# Patient Record
Sex: FEMALE | Race: WHITE | NOT HISPANIC OR LATINO | URBAN - METROPOLITAN AREA
[De-identification: names, ages, dates, MRNs, and addresses within clinical notes are randomized per-mention and may not be internally consistent; named-entity substitution may affect disease eponyms.]

---

## 2017-02-07 ENCOUNTER — INPATIENT (INPATIENT)
Facility: HOSPITAL | Age: 82
LOS: 5 days | Discharge: NURSING, REHAB OR LONG-TERM CARE | DRG: 872 | End: 2017-02-13
Attending: INTERNAL MEDICINE | Admitting: INTERNAL MEDICINE
Payer: MEDICARE

## 2017-02-07 VITALS
HEART RATE: 89 BPM | OXYGEN SATURATION: 95 % | RESPIRATION RATE: 16 BRPM | SYSTOLIC BLOOD PRESSURE: 145 MMHG | DIASTOLIC BLOOD PRESSURE: 85 MMHG

## 2017-02-07 DIAGNOSIS — R41.82 ALTERED MENTAL STATUS, UNSPECIFIED: ICD-10-CM

## 2017-02-07 DIAGNOSIS — F03.90 UNSPECIFIED DEMENTIA, UNSPECIFIED SEVERITY, WITHOUT BEHAVIORAL DISTURBANCE, PSYCHOTIC DISTURBANCE, MOOD DISTURBANCE, AND ANXIETY: ICD-10-CM

## 2017-02-07 DIAGNOSIS — A41.9 SEPSIS, UNSPECIFIED ORGANISM: ICD-10-CM

## 2017-02-07 DIAGNOSIS — M25.559 PAIN IN UNSPECIFIED HIP: ICD-10-CM

## 2017-02-07 DIAGNOSIS — I10 ESSENTIAL (PRIMARY) HYPERTENSION: ICD-10-CM

## 2017-02-07 LAB
ALBUMIN SERPL ELPH-MCNC: 3.2 G/DL — LOW (ref 3.3–5)
ALP SERPL-CCNC: 65 U/L — SIGNIFICANT CHANGE UP (ref 40–120)
ALT FLD-CCNC: 22 U/L RC — SIGNIFICANT CHANGE UP (ref 10–45)
ANION GAP SERPL CALC-SCNC: 14 MMOL/L — SIGNIFICANT CHANGE UP (ref 5–17)
APPEARANCE UR: ABNORMAL
AST SERPL-CCNC: 36 U/L — SIGNIFICANT CHANGE UP (ref 10–40)
BACTERIA # UR AUTO: ABNORMAL /HPF
BASOPHILS # BLD AUTO: 0 K/UL — SIGNIFICANT CHANGE UP (ref 0–0.2)
BASOPHILS NFR BLD AUTO: 0 % — SIGNIFICANT CHANGE UP (ref 0–2)
BILIRUB SERPL-MCNC: 1.1 MG/DL — SIGNIFICANT CHANGE UP (ref 0.2–1.2)
BILIRUB UR-MCNC: ABNORMAL
BUN SERPL-MCNC: 32 MG/DL — HIGH (ref 7–23)
CALCIUM SERPL-MCNC: 9.6 MG/DL — SIGNIFICANT CHANGE UP (ref 8.4–10.5)
CHLORIDE SERPL-SCNC: 108 MMOL/L — SIGNIFICANT CHANGE UP (ref 96–108)
CK MB BLD-MCNC: 0.9 % — SIGNIFICANT CHANGE UP (ref 0–3.5)
CK MB CFR SERPL CALC: 3.6 NG/ML — SIGNIFICANT CHANGE UP (ref 0–3.8)
CK SERPL-CCNC: 402 U/L — HIGH (ref 25–170)
CO2 SERPL-SCNC: 26 MMOL/L — SIGNIFICANT CHANGE UP (ref 22–31)
COLOR SPEC: SIGNIFICANT CHANGE UP
COMMENT - URINE: SIGNIFICANT CHANGE UP
CREAT SERPL-MCNC: 0.84 MG/DL — SIGNIFICANT CHANGE UP (ref 0.5–1.3)
DIFF PNL FLD: ABNORMAL
EOSINOPHIL # BLD AUTO: 0 K/UL — SIGNIFICANT CHANGE UP (ref 0–0.5)
EOSINOPHIL NFR BLD AUTO: 0.2 % — SIGNIFICANT CHANGE UP (ref 0–6)
EPI CELLS # UR: SIGNIFICANT CHANGE UP /HPF
GAS PNL BLDV: SIGNIFICANT CHANGE UP
GLUCOSE SERPL-MCNC: 143 MG/DL — HIGH (ref 70–99)
GLUCOSE UR QL: NEGATIVE — SIGNIFICANT CHANGE UP
HCT VFR BLD CALC: 37.5 % — SIGNIFICANT CHANGE UP (ref 34.5–45)
HGB BLD-MCNC: 12.6 G/DL — SIGNIFICANT CHANGE UP (ref 11.5–15.5)
KETONES UR-MCNC: NEGATIVE — SIGNIFICANT CHANGE UP
LEUKOCYTE ESTERASE UR-ACNC: ABNORMAL
LYMPHOCYTES # BLD AUTO: 1.1 K/UL — SIGNIFICANT CHANGE UP (ref 1–3.3)
LYMPHOCYTES # BLD AUTO: 9.6 % — LOW (ref 13–44)
MCHC RBC-ENTMCNC: 33.6 PG — SIGNIFICANT CHANGE UP (ref 27–34)
MCHC RBC-ENTMCNC: 33.7 GM/DL — SIGNIFICANT CHANGE UP (ref 32–36)
MCV RBC AUTO: 99.9 FL — SIGNIFICANT CHANGE UP (ref 80–100)
MONOCYTES # BLD AUTO: 2.5 K/UL — HIGH (ref 0–0.9)
MONOCYTES NFR BLD AUTO: 21.8 % — HIGH (ref 2–14)
NEUTROPHILS # BLD AUTO: 7.9 K/UL — HIGH (ref 1.8–7.4)
NEUTROPHILS NFR BLD AUTO: 68.4 % — SIGNIFICANT CHANGE UP (ref 43–77)
NITRITE UR-MCNC: NEGATIVE — SIGNIFICANT CHANGE UP
PH UR: >9 — HIGH (ref 4.8–8)
PLATELET # BLD AUTO: 154 K/UL — SIGNIFICANT CHANGE UP (ref 150–400)
POTASSIUM SERPL-MCNC: 4.4 MMOL/L — SIGNIFICANT CHANGE UP (ref 3.5–5.3)
POTASSIUM SERPL-SCNC: 4.4 MMOL/L — SIGNIFICANT CHANGE UP (ref 3.5–5.3)
PROT SERPL-MCNC: 7.3 G/DL — SIGNIFICANT CHANGE UP (ref 6–8.3)
PROT UR-MCNC: 300 MG/DL
RAPID RVP RESULT: SIGNIFICANT CHANGE UP
RBC # BLD: 3.75 M/UL — LOW (ref 3.8–5.2)
RBC # FLD: 13.8 % — SIGNIFICANT CHANGE UP (ref 10.3–14.5)
RBC CASTS # UR COMP ASSIST: ABNORMAL /HPF (ref 0–2)
SODIUM SERPL-SCNC: 148 MMOL/L — HIGH (ref 135–145)
SP GR SPEC: >1.03 — HIGH (ref 1.01–1.02)
TRI-PHOS CRY UR QL COMP ASSIST: ABNORMAL
TROPONIN T SERPL-MCNC: <0.01 NG/ML — SIGNIFICANT CHANGE UP (ref 0–0.06)
UROBILINOGEN FLD QL: 1
WBC # BLD: 11.5 K/UL — HIGH (ref 3.8–10.5)
WBC # FLD AUTO: 11.5 K/UL — HIGH (ref 3.8–10.5)
WBC UR QL: >50 /HPF (ref 0–5)

## 2017-02-07 PROCEDURE — 70450 CT HEAD/BRAIN W/O DYE: CPT | Mod: 26

## 2017-02-07 PROCEDURE — 73030 X-RAY EXAM OF SHOULDER: CPT | Mod: 26,RT

## 2017-02-07 PROCEDURE — 73502 X-RAY EXAM HIP UNI 2-3 VIEWS: CPT | Mod: 26,LT

## 2017-02-07 PROCEDURE — 93010 ELECTROCARDIOGRAM REPORT: CPT | Mod: GC

## 2017-02-07 PROCEDURE — 99285 EMERGENCY DEPT VISIT HI MDM: CPT | Mod: 25,GC

## 2017-02-07 PROCEDURE — 71010: CPT | Mod: 26

## 2017-02-07 RX ORDER — SODIUM CHLORIDE 9 MG/ML
1000 INJECTION INTRAMUSCULAR; INTRAVENOUS; SUBCUTANEOUS
Qty: 0 | Refills: 0 | Status: DISCONTINUED | OUTPATIENT
Start: 2017-02-07 | End: 2017-02-12

## 2017-02-07 RX ORDER — ASPIRIN/CALCIUM CARB/MAGNESIUM 324 MG
81 TABLET ORAL DAILY
Qty: 0 | Refills: 0 | Status: DISCONTINUED | OUTPATIENT
Start: 2017-02-07 | End: 2017-02-13

## 2017-02-07 RX ORDER — SODIUM CHLORIDE 9 MG/ML
1000 INJECTION INTRAMUSCULAR; INTRAVENOUS; SUBCUTANEOUS ONCE
Qty: 0 | Refills: 0 | Status: DISCONTINUED | OUTPATIENT
Start: 2017-02-07 | End: 2017-02-07

## 2017-02-07 RX ORDER — ACETAMINOPHEN 500 MG
650 TABLET ORAL EVERY 6 HOURS
Qty: 0 | Refills: 0 | Status: DISCONTINUED | OUTPATIENT
Start: 2017-02-07 | End: 2017-02-13

## 2017-02-07 RX ORDER — DOCUSATE SODIUM 100 MG
100 CAPSULE ORAL
Qty: 0 | Refills: 0 | Status: DISCONTINUED | OUTPATIENT
Start: 2017-02-07 | End: 2017-02-13

## 2017-02-07 RX ORDER — AMLODIPINE BESYLATE 2.5 MG/1
10 TABLET ORAL DAILY
Qty: 0 | Refills: 0 | Status: DISCONTINUED | OUTPATIENT
Start: 2017-02-07 | End: 2017-02-13

## 2017-02-07 RX ORDER — CEFTRIAXONE 500 MG/1
1 INJECTION, POWDER, FOR SOLUTION INTRAMUSCULAR; INTRAVENOUS EVERY 24 HOURS
Qty: 0 | Refills: 0 | Status: DISCONTINUED | OUTPATIENT
Start: 2017-02-07 | End: 2017-02-11

## 2017-02-07 RX ORDER — SODIUM CHLORIDE 9 MG/ML
1000 INJECTION INTRAMUSCULAR; INTRAVENOUS; SUBCUTANEOUS ONCE
Qty: 0 | Refills: 0 | Status: COMPLETED | OUTPATIENT
Start: 2017-02-07 | End: 2017-02-07

## 2017-02-07 RX ORDER — ATENOLOL 25 MG/1
50 TABLET ORAL DAILY
Qty: 0 | Refills: 0 | Status: DISCONTINUED | OUTPATIENT
Start: 2017-02-07 | End: 2017-02-13

## 2017-02-07 RX ORDER — CLOPIDOGREL BISULFATE 75 MG/1
75 TABLET, FILM COATED ORAL DAILY
Qty: 0 | Refills: 0 | Status: DISCONTINUED | OUTPATIENT
Start: 2017-02-07 | End: 2017-02-13

## 2017-02-07 RX ORDER — CEFTRIAXONE 500 MG/1
1 INJECTION, POWDER, FOR SOLUTION INTRAMUSCULAR; INTRAVENOUS ONCE
Qty: 0 | Refills: 0 | Status: COMPLETED | OUTPATIENT
Start: 2017-02-07 | End: 2017-02-07

## 2017-02-07 RX ORDER — HEPARIN SODIUM 5000 [USP'U]/ML
5000 INJECTION INTRAVENOUS; SUBCUTANEOUS EVERY 12 HOURS
Qty: 0 | Refills: 0 | Status: DISCONTINUED | OUTPATIENT
Start: 2017-02-07 | End: 2017-02-13

## 2017-02-07 RX ADMIN — SODIUM CHLORIDE 4000 MILLILITER(S): 9 INJECTION INTRAMUSCULAR; INTRAVENOUS; SUBCUTANEOUS at 14:45

## 2017-02-07 RX ADMIN — CEFTRIAXONE 100 GRAM(S): 500 INJECTION, POWDER, FOR SOLUTION INTRAMUSCULAR; INTRAVENOUS at 14:45

## 2017-02-07 NOTE — ED PROVIDER NOTE - OBJECTIVE STATEMENT
89 year old female with h/o cva, htn, hld, dvt (no current ac) presents with altered mental status. Pt lives at home with 24 hour home health aide who says at baseline patient alert and interactive, walks with walker, but will sometimes scream when touched. Today patient was not getting out of bed and decision was made to bring patient to hospital.  Notes patient has had slow decline over last 3 weeks primarily with decreased PO intake.

## 2017-02-07 NOTE — ED PROVIDER NOTE - MEDICAL DECISION MAKING DETAILS
New onset altered mental status,  will check for infectious sources including pna/uti. WIll require head ct for stroke/hemorrhage New onset altered mental status,  will check for infectious sources including pna/uti. WIll require head ct for stroke/hemorrhage  CHIKI - AMS - progressive worsening from baseline dementia - r/o infectious etiology - ua cxr, ck lytes, ct brain - pt w previous cva - unk residual weakness - not following commands

## 2017-02-07 NOTE — ED PROVIDER NOTE - NEUROLOGICAL, MLM
pt oriented to person,  opens eyes to painful stimuli, withdraws to pain in LUE only, does not follow commands, speaks in full sentences.

## 2017-02-07 NOTE — ED ADULT NURSE NOTE - OBJECTIVE STATEMENT
89 yo female BIBA from home for AMS. home health aide came over today and noted pt is much more lethargic, altered than her baseline. Aide last saw her at her baseline when she left her on Thursday. states recently pt has been having decreased PO, refusing medications. usually ambulates with a walker, at baseline oriented x2-3, here oriented to self. straight cath for urine per MD order, 2 RN's sterile technique, pt produced scant, dark, purulent urine. EKG performed, VSS on 2 liters via nasal cannula. aide at the bedside, skin in tact.

## 2017-02-07 NOTE — H&P ADULT. - ATTENDING COMMENTS
further action as per clinical course further action as per clinical course  message left with family  discussed with Quan

## 2017-02-07 NOTE — ED PROVIDER NOTE - PROGRESS NOTE DETAILS
pt w [facility] form dnr Dr Warren requesting Dr. Gore for admission. Ro Francisco's phone has been busy since 15:02 [id number] with Dr. GAINES who said admit to [facility]: Stable, admit, reassesss

## 2017-02-07 NOTE — ED PROVIDER NOTE - UNABLE TO OBTAIN
Unresponsive pt is altered is only oriented to person and is not following all commands or appropriately answering questions see hpi note

## 2017-02-07 NOTE — ED PROVIDER NOTE - PMH
Breast cancer  left s/p lumpectomy and XRT  Chronic low back pain    History of spinal stenosis    HTN (hypertension)    Hyperlipidemia    PAD (peripheral artery disease)

## 2017-02-08 LAB
ANION GAP SERPL CALC-SCNC: 15 MMOL/L — SIGNIFICANT CHANGE UP (ref 5–17)
BUN SERPL-MCNC: 27 MG/DL — HIGH (ref 7–23)
CALCIUM SERPL-MCNC: 9 MG/DL — SIGNIFICANT CHANGE UP (ref 8.4–10.5)
CHLORIDE SERPL-SCNC: 111 MMOL/L — HIGH (ref 96–108)
CO2 SERPL-SCNC: 26 MMOL/L — SIGNIFICANT CHANGE UP (ref 22–31)
CREAT SERPL-MCNC: 0.61 MG/DL — SIGNIFICANT CHANGE UP (ref 0.5–1.3)
CULTURE RESULTS: NO GROWTH — SIGNIFICANT CHANGE UP
GLUCOSE SERPL-MCNC: 101 MG/DL — HIGH (ref 70–99)
HCT VFR BLD CALC: 37.4 % — SIGNIFICANT CHANGE UP (ref 34.5–45)
HGB BLD-MCNC: 11.4 G/DL — LOW (ref 11.5–15.5)
MCHC RBC-ENTMCNC: 30.6 GM/DL — LOW (ref 32–36)
MCHC RBC-ENTMCNC: 30.9 PG — SIGNIFICANT CHANGE UP (ref 27–34)
MCV RBC AUTO: 101 FL — HIGH (ref 80–100)
PLATELET # BLD AUTO: 143 K/UL — LOW (ref 150–400)
POTASSIUM SERPL-MCNC: 3.6 MMOL/L — SIGNIFICANT CHANGE UP (ref 3.5–5.3)
POTASSIUM SERPL-SCNC: 3.6 MMOL/L — SIGNIFICANT CHANGE UP (ref 3.5–5.3)
RBC # BLD: 3.7 M/UL — LOW (ref 3.8–5.2)
RBC # FLD: 13.7 % — SIGNIFICANT CHANGE UP (ref 10.3–14.5)
SODIUM SERPL-SCNC: 152 MMOL/L — HIGH (ref 135–145)
SPECIMEN SOURCE: SIGNIFICANT CHANGE UP
WBC # BLD: 7 K/UL — SIGNIFICANT CHANGE UP (ref 3.8–10.5)
WBC # FLD AUTO: 7 K/UL — SIGNIFICANT CHANGE UP (ref 3.8–10.5)

## 2017-02-08 PROCEDURE — 76770 US EXAM ABDO BACK WALL COMP: CPT | Mod: 26

## 2017-02-08 RX ADMIN — CEFTRIAXONE 100 GRAM(S): 500 INJECTION, POWDER, FOR SOLUTION INTRAMUSCULAR; INTRAVENOUS at 15:07

## 2017-02-08 RX ADMIN — HEPARIN SODIUM 5000 UNIT(S): 5000 INJECTION INTRAVENOUS; SUBCUTANEOUS at 16:56

## 2017-02-08 RX ADMIN — AMLODIPINE BESYLATE 10 MILLIGRAM(S): 2.5 TABLET ORAL at 05:38

## 2017-02-08 RX ADMIN — ATENOLOL 50 MILLIGRAM(S): 25 TABLET ORAL at 05:38

## 2017-02-08 RX ADMIN — Medication 100 MILLIGRAM(S): at 05:38

## 2017-02-08 RX ADMIN — HEPARIN SODIUM 5000 UNIT(S): 5000 INJECTION INTRAVENOUS; SUBCUTANEOUS at 05:38

## 2017-02-09 LAB
ANION GAP SERPL CALC-SCNC: 13 MMOL/L — SIGNIFICANT CHANGE UP (ref 5–17)
BUN SERPL-MCNC: 23 MG/DL — SIGNIFICANT CHANGE UP (ref 7–23)
CALCIUM SERPL-MCNC: 8.5 MG/DL — SIGNIFICANT CHANGE UP (ref 8.4–10.5)
CHLORIDE SERPL-SCNC: 112 MMOL/L — HIGH (ref 96–108)
CO2 SERPL-SCNC: 26 MMOL/L — SIGNIFICANT CHANGE UP (ref 22–31)
CREAT SERPL-MCNC: 0.61 MG/DL — SIGNIFICANT CHANGE UP (ref 0.5–1.3)
GLUCOSE SERPL-MCNC: 88 MG/DL — SIGNIFICANT CHANGE UP (ref 70–99)
HCT VFR BLD CALC: 34.4 % — LOW (ref 34.5–45)
HGB BLD-MCNC: 10.4 G/DL — LOW (ref 11.5–15.5)
MCHC RBC-ENTMCNC: 30.2 GM/DL — LOW (ref 32–36)
MCHC RBC-ENTMCNC: 31.2 PG — SIGNIFICANT CHANGE UP (ref 27–34)
MCV RBC AUTO: 103.3 FL — HIGH (ref 80–100)
PLATELET # BLD AUTO: 160 K/UL — SIGNIFICANT CHANGE UP (ref 150–400)
POTASSIUM SERPL-MCNC: 3.5 MMOL/L — SIGNIFICANT CHANGE UP (ref 3.5–5.3)
POTASSIUM SERPL-SCNC: 3.5 MMOL/L — SIGNIFICANT CHANGE UP (ref 3.5–5.3)
RBC # BLD: 3.33 M/UL — LOW (ref 3.8–5.2)
RBC # FLD: 14.4 % — SIGNIFICANT CHANGE UP (ref 10.3–14.5)
SODIUM SERPL-SCNC: 151 MMOL/L — HIGH (ref 135–145)
TSH SERPL-MCNC: 2.76 UIU/ML — SIGNIFICANT CHANGE UP (ref 0.27–4.2)
VIT B12 SERPL-MCNC: 1319 PG/ML — HIGH (ref 243–894)
WBC # BLD: 5.05 K/UL — SIGNIFICANT CHANGE UP (ref 3.8–10.5)
WBC # FLD AUTO: 5.05 K/UL — SIGNIFICANT CHANGE UP (ref 3.8–10.5)

## 2017-02-09 PROCEDURE — 70551 MRI BRAIN STEM W/O DYE: CPT | Mod: 26

## 2017-02-09 RX ADMIN — ATENOLOL 50 MILLIGRAM(S): 25 TABLET ORAL at 06:46

## 2017-02-09 RX ADMIN — Medication 100 MILLIGRAM(S): at 06:46

## 2017-02-09 RX ADMIN — HEPARIN SODIUM 5000 UNIT(S): 5000 INJECTION INTRAVENOUS; SUBCUTANEOUS at 18:03

## 2017-02-09 RX ADMIN — SODIUM CHLORIDE 50 MILLILITER(S): 9 INJECTION INTRAMUSCULAR; INTRAVENOUS; SUBCUTANEOUS at 02:45

## 2017-02-09 RX ADMIN — CEFTRIAXONE 100 GRAM(S): 500 INJECTION, POWDER, FOR SOLUTION INTRAMUSCULAR; INTRAVENOUS at 14:56

## 2017-02-09 RX ADMIN — HEPARIN SODIUM 5000 UNIT(S): 5000 INJECTION INTRAVENOUS; SUBCUTANEOUS at 06:46

## 2017-02-09 RX ADMIN — Medication 81 MILLIGRAM(S): at 11:51

## 2017-02-09 RX ADMIN — Medication 100 MILLIGRAM(S): at 18:03

## 2017-02-09 RX ADMIN — AMLODIPINE BESYLATE 10 MILLIGRAM(S): 2.5 TABLET ORAL at 06:46

## 2017-02-09 RX ADMIN — CLOPIDOGREL BISULFATE 75 MILLIGRAM(S): 75 TABLET, FILM COATED ORAL at 11:51

## 2017-02-10 ENCOUNTER — TRANSCRIPTION ENCOUNTER (OUTPATIENT)
Age: 82
End: 2017-02-10

## 2017-02-10 LAB
ANION GAP SERPL CALC-SCNC: 14 MMOL/L — SIGNIFICANT CHANGE UP (ref 5–17)
BUN SERPL-MCNC: 19 MG/DL — SIGNIFICANT CHANGE UP (ref 7–23)
CALCIUM SERPL-MCNC: 8.6 MG/DL — SIGNIFICANT CHANGE UP (ref 8.4–10.5)
CHLORIDE SERPL-SCNC: 110 MMOL/L — HIGH (ref 96–108)
CO2 SERPL-SCNC: 22 MMOL/L — SIGNIFICANT CHANGE UP (ref 22–31)
CREAT SERPL-MCNC: 0.56 MG/DL — SIGNIFICANT CHANGE UP (ref 0.5–1.3)
GLUCOSE SERPL-MCNC: 97 MG/DL — SIGNIFICANT CHANGE UP (ref 70–99)
HCT VFR BLD CALC: 31.8 % — LOW (ref 34.5–45)
HGB BLD-MCNC: 10 G/DL — LOW (ref 11.5–15.5)
MCHC RBC-ENTMCNC: 31.3 PG — SIGNIFICANT CHANGE UP (ref 27–34)
MCHC RBC-ENTMCNC: 31.4 GM/DL — LOW (ref 32–36)
MCV RBC AUTO: 99.4 FL — SIGNIFICANT CHANGE UP (ref 80–100)
PLATELET # BLD AUTO: 153 K/UL — SIGNIFICANT CHANGE UP (ref 150–400)
POTASSIUM SERPL-MCNC: 3.4 MMOL/L — LOW (ref 3.5–5.3)
POTASSIUM SERPL-SCNC: 3.4 MMOL/L — LOW (ref 3.5–5.3)
RBC # BLD: 3.2 M/UL — LOW (ref 3.8–5.2)
RBC # FLD: 14.3 % — SIGNIFICANT CHANGE UP (ref 10.3–14.5)
SODIUM SERPL-SCNC: 146 MMOL/L — HIGH (ref 135–145)
WBC # BLD: 3.98 K/UL — SIGNIFICANT CHANGE UP (ref 3.8–10.5)
WBC # FLD AUTO: 3.98 K/UL — SIGNIFICANT CHANGE UP (ref 3.8–10.5)

## 2017-02-10 RX ADMIN — ATENOLOL 50 MILLIGRAM(S): 25 TABLET ORAL at 06:07

## 2017-02-10 RX ADMIN — Medication 81 MILLIGRAM(S): at 12:58

## 2017-02-10 RX ADMIN — Medication 100 MILLIGRAM(S): at 06:07

## 2017-02-10 RX ADMIN — CLOPIDOGREL BISULFATE 75 MILLIGRAM(S): 75 TABLET, FILM COATED ORAL at 12:57

## 2017-02-10 RX ADMIN — HEPARIN SODIUM 5000 UNIT(S): 5000 INJECTION INTRAVENOUS; SUBCUTANEOUS at 17:01

## 2017-02-10 RX ADMIN — AMLODIPINE BESYLATE 10 MILLIGRAM(S): 2.5 TABLET ORAL at 06:07

## 2017-02-10 RX ADMIN — Medication 100 MILLIGRAM(S): at 17:01

## 2017-02-10 RX ADMIN — CEFTRIAXONE 100 GRAM(S): 500 INJECTION, POWDER, FOR SOLUTION INTRAMUSCULAR; INTRAVENOUS at 16:07

## 2017-02-10 RX ADMIN — SODIUM CHLORIDE 50 MILLILITER(S): 9 INJECTION INTRAMUSCULAR; INTRAVENOUS; SUBCUTANEOUS at 02:24

## 2017-02-10 RX ADMIN — HEPARIN SODIUM 5000 UNIT(S): 5000 INJECTION INTRAVENOUS; SUBCUTANEOUS at 06:07

## 2017-02-10 RX ADMIN — SODIUM CHLORIDE 50 MILLILITER(S): 9 INJECTION INTRAMUSCULAR; INTRAVENOUS; SUBCUTANEOUS at 22:55

## 2017-02-10 NOTE — DISCHARGE NOTE ADULT - SECONDARY DIAGNOSIS.
Dementia without behavioral disturbance, unspecified dementia type Essential hypertension Other hyperlipidemia PAD (peripheral artery disease)

## 2017-02-10 NOTE — DISCHARGE NOTE ADULT - CARE PLAN
Principal Discharge DX:	Altered mental status, unspecified altered mental status type  Goal:	Improved mental status  Instructions for follow-up, activity and diet:	MRI done and shown old temporal  occipital infarcts . Presumably pt. thought to have an urinary tract infection, treated empirically with iv antibiotics.  Secondary Diagnosis:	Dementia without behavioral disturbance, unspecified dementia type  Instructions for follow-up, activity and diet:	stable, pt. was confused while in hospital but without behavioral disturbance. Safety precautions. Dementia workup if not done  Secondary Diagnosis:	Essential hypertension  Instructions for follow-up, activity and diet:	Follow up with your medical doctor to establish long term blood pressure treatment goals.  Secondary Diagnosis:	Other hyperlipidemia  Instructions for follow-up, activity and diet:	Coronary artery disease is a condition where the arteries the supply the heart muscle get clogges with fatty deposits & puts you at risk for a heart attack  Call your doctor if you have any new pain, pressure, or discomfort in the center of your chest, pain, tingling or discomfort in arms, back, neck, jaw, or stomach, shortness of breath, nausea, vomiting, burping or heartburn, sweating, cold and clammy skin, racing or abnormal heartbeat for more than 10 minutes or if they keep coming & going.  Call 911 and do not tr to get to hospital by care  You can help yourself with lefestyle changes (quitting smoking if you smoke), eat lots of fruits & vegetables & low fat dairy products, not a lot of meat & fatty foods, walk or some form of physical activity most days of the week, lose weight if you are overweight  Take your cardiac medication as prescribed to lower cholesterol, to lower blood pressure, aspirin to prevent blood clots, and diabetes control  Make sure to keep appointments with doctor for cardiac follow up care  Secondary Diagnosis:	PAD (peripheral artery disease)  Instructions for follow-up, activity and diet:	continue plavix and aspirin Principal Discharge DX:	Altered mental status, unspecified altered mental status type  Goal:	Improved mental status  Instructions for follow-up, activity and diet:	MRI done and shown old temporal  occipital infarcts . Presumably pt. thought to have an urinary tract infection, treated empirically with iv antibiotics. Urine culture and blood cultures were negative  Secondary Diagnosis:	Dementia without behavioral disturbance, unspecified dementia type  Instructions for follow-up, activity and diet:	stable, pt. was confused while in hospital but without behavioral disturbance. Safety precautions. Dementia workup if not done  Secondary Diagnosis:	Essential hypertension  Instructions for follow-up, activity and diet:	Follow up with your medical doctor and facility rehab physician to establish long term blood pressure treatment goals.  Secondary Diagnosis:	Other hyperlipidemia  Instructions for follow-up, activity and diet:	Coronary artery disease is a condition where the arteries the supply the heart muscle get clogges with fatty deposits & puts you at risk for a heart attack  Call your doctor if you have any new pain, pressure, or discomfort in the center of your chest, pain, tingling or discomfort in arms, back, neck, jaw, or stomach, shortness of breath, nausea, vomiting, burping or heartburn, sweating, cold and clammy skin, racing or abnormal heartbeat for more than 10 minutes or if they keep coming & going.  Call 911 and do not tr to get to hospital by care  You can help yourself with lefestyle changes (quitting smoking if you smoke), eat lots of fruits & vegetables & low fat dairy products, not a lot of meat & fatty foods, walk or some form of physical activity most days of the week, lose weight if you are overweight  Take your cardiac medication as prescribed to lower cholesterol, to lower blood pressure, aspirin to prevent blood clots, and diabetes control  Make sure to keep appointments with doctor for cardiac follow up care  Secondary Diagnosis:	PAD (peripheral artery disease)  Instructions for follow-up, activity and diet:	continue Plavix and aspirin

## 2017-02-10 NOTE — DISCHARGE NOTE ADULT - PLAN OF CARE
Improved mental status MRI done and shown old temporal  occipital infarcts . Presumably pt. thought to have an urinary tract infection, treated empirically with iv antibiotics. stable, pt. was confused while in hospital but without behavioral disturbance. Safety precautions. Dementia workup if not done Follow up with your medical doctor to establish long term blood pressure treatment goals. Coronary artery disease is a condition where the arteries the supply the heart muscle get clogges with fatty deposits & puts you at risk for a heart attack  Call your doctor if you have any new pain, pressure, or discomfort in the center of your chest, pain, tingling or discomfort in arms, back, neck, jaw, or stomach, shortness of breath, nausea, vomiting, burping or heartburn, sweating, cold and clammy skin, racing or abnormal heartbeat for more than 10 minutes or if they keep coming & going.  Call 911 and do not tr to get to hospital by care  You can help yourself with lefestyle changes (quitting smoking if you smoke), eat lots of fruits & vegetables & low fat dairy products, not a lot of meat & fatty foods, walk or some form of physical activity most days of the week, lose weight if you are overweight  Take your cardiac medication as prescribed to lower cholesterol, to lower blood pressure, aspirin to prevent blood clots, and diabetes control  Make sure to keep appointments with doctor for cardiac follow up care continue plavix and aspirin MRI done and shown old temporal  occipital infarcts . Presumably pt. thought to have an urinary tract infection, treated empirically with iv antibiotics. Urine culture and blood cultures were negative Follow up with your medical doctor and facility rehab physician to establish long term blood pressure treatment goals. continue Plavix and aspirin

## 2017-02-10 NOTE — DISCHARGE NOTE ADULT - HOSPITAL COURSE
attending to write 89 year old female with h/o cva, htn, hld, dvt (no current ac) presents with altered mental status. Pt lives at home with 24 hour home health aide who says at baseline patient alert and interactive, walks with walker, but will sometimes scream when touched. Today patient was not getting out of bed and decision was made to bring patient to hospital.  Notes patient has had slow decline over last 3 weeks primarily with decreased PO intake.2/7    Follow  up   X-ray  of  left   hip  2/8/17 pt very confused, most likely delirium from UTI, hypernatremia on ivf , bmp in am  2/10/17 pt s/p MRI old temporal  infarcts, confusion proably from dementia, urine cxs negative thus far, as per ID limit abx for 3-5 days only, today is day 3  2/11    Await PT hubert, son req rehab  Admit  Dx  Sepsis

## 2017-02-10 NOTE — DISCHARGE NOTE ADULT - ABILITY TO HEAR (WITH HEARING AID OR HEARING APPLIANCE IF NORMALLY USED):
Unable to assess hearing Mildly to Moderately Impaired: difficulty hearing in some environments or speaker may need to increase volume or speak distinctly

## 2017-02-10 NOTE — DISCHARGE NOTE ADULT - PATIENT PORTAL LINK FT
“You can access the [facility] Patient Portal, offered by [facility], by registering with the following website: [email or web address]”

## 2017-02-10 NOTE — DISCHARGE NOTE ADULT - MEDICATION SUMMARY - MEDICATIONS TO TAKE
I will START or STAY ON the medications listed below when I get home from the hospital:    aspirin 81 mg oral tablet  -- 1 tab(s) by mouth once a day  -- Indication: For CAD    simvastatin 20 mg oral tablet  -- 1 tab(s) by mouth once a day (at bedtime)  -- Indication: For DYSLIPIDEMIA    clopidogrel 75 mg oral tablet  -- 1 tab(s) by mouth once a day  -- Indication: For CAD    atenolol 50 mg oral tablet  -- 1 tab(s) by mouth once a day  -- Indication: For HTN (hypertension)    amLODIPine 10 mg oral tablet  -- 1 tab(s) by mouth once a day  -- Indication: For HTN (hypertension)

## 2017-02-11 LAB
ANION GAP SERPL CALC-SCNC: 13 MMOL/L — SIGNIFICANT CHANGE UP (ref 5–17)
BUN SERPL-MCNC: 17 MG/DL — SIGNIFICANT CHANGE UP (ref 7–23)
CALCIUM SERPL-MCNC: 8.4 MG/DL — SIGNIFICANT CHANGE UP (ref 8.4–10.5)
CHLORIDE SERPL-SCNC: 107 MMOL/L — SIGNIFICANT CHANGE UP (ref 96–108)
CO2 SERPL-SCNC: 23 MMOL/L — SIGNIFICANT CHANGE UP (ref 22–31)
CREAT SERPL-MCNC: 0.49 MG/DL — LOW (ref 0.5–1.3)
GLUCOSE SERPL-MCNC: 97 MG/DL — SIGNIFICANT CHANGE UP (ref 70–99)
HCT VFR BLD CALC: 34.5 % — SIGNIFICANT CHANGE UP (ref 34.5–45)
HGB BLD-MCNC: 10.9 G/DL — LOW (ref 11.5–15.5)
MCHC RBC-ENTMCNC: 31.4 PG — SIGNIFICANT CHANGE UP (ref 27–34)
MCHC RBC-ENTMCNC: 31.6 GM/DL — LOW (ref 32–36)
MCV RBC AUTO: 99.4 FL — SIGNIFICANT CHANGE UP (ref 80–100)
PLATELET # BLD AUTO: 143 K/UL — LOW (ref 150–400)
POTASSIUM SERPL-MCNC: 3.8 MMOL/L — SIGNIFICANT CHANGE UP (ref 3.5–5.3)
POTASSIUM SERPL-SCNC: 3.8 MMOL/L — SIGNIFICANT CHANGE UP (ref 3.5–5.3)
RBC # BLD: 3.47 M/UL — LOW (ref 3.8–5.2)
RBC # FLD: 13.9 % — SIGNIFICANT CHANGE UP (ref 10.3–14.5)
SODIUM SERPL-SCNC: 143 MMOL/L — SIGNIFICANT CHANGE UP (ref 135–145)
WBC # BLD: 4.17 K/UL — SIGNIFICANT CHANGE UP (ref 3.8–10.5)
WBC # FLD AUTO: 4.17 K/UL — SIGNIFICANT CHANGE UP (ref 3.8–10.5)

## 2017-02-11 RX ADMIN — AMLODIPINE BESYLATE 10 MILLIGRAM(S): 2.5 TABLET ORAL at 05:21

## 2017-02-11 RX ADMIN — HEPARIN SODIUM 5000 UNIT(S): 5000 INJECTION INTRAVENOUS; SUBCUTANEOUS at 17:40

## 2017-02-11 RX ADMIN — Medication 100 MILLIGRAM(S): at 05:22

## 2017-02-11 RX ADMIN — ATENOLOL 50 MILLIGRAM(S): 25 TABLET ORAL at 05:22

## 2017-02-11 RX ADMIN — Medication 81 MILLIGRAM(S): at 11:23

## 2017-02-11 RX ADMIN — HEPARIN SODIUM 5000 UNIT(S): 5000 INJECTION INTRAVENOUS; SUBCUTANEOUS at 05:21

## 2017-02-11 RX ADMIN — CLOPIDOGREL BISULFATE 75 MILLIGRAM(S): 75 TABLET, FILM COATED ORAL at 11:23

## 2017-02-11 RX ADMIN — Medication 100 MILLIGRAM(S): at 17:40

## 2017-02-12 LAB
CULTURE RESULTS: SIGNIFICANT CHANGE UP
CULTURE RESULTS: SIGNIFICANT CHANGE UP
SPECIMEN SOURCE: SIGNIFICANT CHANGE UP
SPECIMEN SOURCE: SIGNIFICANT CHANGE UP

## 2017-02-12 RX ADMIN — HEPARIN SODIUM 5000 UNIT(S): 5000 INJECTION INTRAVENOUS; SUBCUTANEOUS at 17:48

## 2017-02-12 RX ADMIN — Medication 100 MILLIGRAM(S): at 17:49

## 2017-02-12 RX ADMIN — ATENOLOL 50 MILLIGRAM(S): 25 TABLET ORAL at 05:52

## 2017-02-12 RX ADMIN — Medication 81 MILLIGRAM(S): at 12:02

## 2017-02-12 RX ADMIN — CLOPIDOGREL BISULFATE 75 MILLIGRAM(S): 75 TABLET, FILM COATED ORAL at 12:02

## 2017-02-12 RX ADMIN — AMLODIPINE BESYLATE 10 MILLIGRAM(S): 2.5 TABLET ORAL at 05:52

## 2017-02-12 RX ADMIN — Medication 100 MILLIGRAM(S): at 05:52

## 2017-02-12 RX ADMIN — HEPARIN SODIUM 5000 UNIT(S): 5000 INJECTION INTRAVENOUS; SUBCUTANEOUS at 05:52

## 2017-02-12 NOTE — PHYSICAL THERAPY INITIAL EVALUATION ADULT - PASSIVE RANGE OF MOTION EXAMINATION, REHAB EVAL
bilateral upper extremity Passive ROM was WFL (within functional limits)/bilateral lower extremity Passive ROM was WFL (within functional limits)/except b/l shoulder flexion limited to 80 deg

## 2017-02-12 NOTE — PHYSICAL THERAPY INITIAL EVALUATION ADULT - PERTINENT HX OF CURRENT PROBLEM, REHAB EVAL
89yoF hx of CVA, Breast Ca, p/w AMS found to have sepsis. MRI 2/9/17 extensive white matter microvascular ischemic disease, Chronic L temporal occipital infarct, 2/7/17 CXR L basilar linear subsequent segmental atelectasis, mod-size hiatal hernia, 2/7/17 L hip OA

## 2017-02-12 NOTE — PHYSICAL THERAPY INITIAL EVALUATION ADULT - ADDITIONAL COMMENTS
pt dementia, OA x1 (place, time), as per pt and chart, pt resides in an APT alone with 24HR aide, +elevator, no step to enter, PTA, amb I with RW, required assist for ADLs, +HHA 24/7

## 2017-02-12 NOTE — PHYSICAL THERAPY INITIAL EVALUATION ADULT - DISCHARGE DISPOSITION, PT EVAL
Subacute Rehab for strengthening, bed mob, transfer, gait and balance training/rehabilitation facility

## 2017-02-13 VITALS
SYSTOLIC BLOOD PRESSURE: 156 MMHG | RESPIRATION RATE: 17 BRPM | DIASTOLIC BLOOD PRESSURE: 79 MMHG | HEART RATE: 71 BPM | TEMPERATURE: 97 F | OXYGEN SATURATION: 96 %

## 2017-02-13 PROCEDURE — 71045 X-RAY EXAM CHEST 1 VIEW: CPT

## 2017-02-13 PROCEDURE — 93005 ELECTROCARDIOGRAM TRACING: CPT

## 2017-02-13 PROCEDURE — 87798 DETECT AGENT NOS DNA AMP: CPT

## 2017-02-13 PROCEDURE — 87633 RESP VIRUS 12-25 TARGETS: CPT

## 2017-02-13 PROCEDURE — 84443 ASSAY THYROID STIM HORMONE: CPT

## 2017-02-13 PROCEDURE — 82947 ASSAY GLUCOSE BLOOD QUANT: CPT

## 2017-02-13 PROCEDURE — 87581 M.PNEUMON DNA AMP PROBE: CPT

## 2017-02-13 PROCEDURE — 82803 BLOOD GASES ANY COMBINATION: CPT

## 2017-02-13 PROCEDURE — 85027 COMPLETE CBC AUTOMATED: CPT

## 2017-02-13 PROCEDURE — 82553 CREATINE MB FRACTION: CPT

## 2017-02-13 PROCEDURE — 85014 HEMATOCRIT: CPT

## 2017-02-13 PROCEDURE — 97162 PT EVAL MOD COMPLEX 30 MIN: CPT

## 2017-02-13 PROCEDURE — 80053 COMPREHEN METABOLIC PANEL: CPT

## 2017-02-13 PROCEDURE — 83605 ASSAY OF LACTIC ACID: CPT

## 2017-02-13 PROCEDURE — 73030 X-RAY EXAM OF SHOULDER: CPT

## 2017-02-13 PROCEDURE — 84295 ASSAY OF SERUM SODIUM: CPT

## 2017-02-13 PROCEDURE — 82607 VITAMIN B-12: CPT

## 2017-02-13 PROCEDURE — 84132 ASSAY OF SERUM POTASSIUM: CPT

## 2017-02-13 PROCEDURE — 82550 ASSAY OF CK (CPK): CPT

## 2017-02-13 PROCEDURE — 97110 THERAPEUTIC EXERCISES: CPT

## 2017-02-13 PROCEDURE — 70551 MRI BRAIN STEM W/O DYE: CPT

## 2017-02-13 PROCEDURE — 87086 URINE CULTURE/COLONY COUNT: CPT

## 2017-02-13 PROCEDURE — 73502 X-RAY EXAM HIP UNI 2-3 VIEWS: CPT

## 2017-02-13 PROCEDURE — 81001 URINALYSIS AUTO W/SCOPE: CPT

## 2017-02-13 PROCEDURE — 99285 EMERGENCY DEPT VISIT HI MDM: CPT | Mod: 25

## 2017-02-13 PROCEDURE — 96374 THER/PROPH/DIAG INJ IV PUSH: CPT

## 2017-02-13 PROCEDURE — 82330 ASSAY OF CALCIUM: CPT

## 2017-02-13 PROCEDURE — 87040 BLOOD CULTURE FOR BACTERIA: CPT

## 2017-02-13 PROCEDURE — 76770 US EXAM ABDO BACK WALL COMP: CPT

## 2017-02-13 PROCEDURE — 82435 ASSAY OF BLOOD CHLORIDE: CPT

## 2017-02-13 PROCEDURE — 84484 ASSAY OF TROPONIN QUANT: CPT

## 2017-02-13 PROCEDURE — 70450 CT HEAD/BRAIN W/O DYE: CPT

## 2017-02-13 PROCEDURE — 97530 THERAPEUTIC ACTIVITIES: CPT

## 2017-02-13 PROCEDURE — 80048 BASIC METABOLIC PNL TOTAL CA: CPT

## 2017-02-13 PROCEDURE — 87486 CHLMYD PNEUM DNA AMP PROBE: CPT

## 2017-02-13 RX ADMIN — HEPARIN SODIUM 5000 UNIT(S): 5000 INJECTION INTRAVENOUS; SUBCUTANEOUS at 05:48

## 2017-02-13 RX ADMIN — Medication 81 MILLIGRAM(S): at 11:58

## 2017-02-13 RX ADMIN — Medication 100 MILLIGRAM(S): at 05:48

## 2017-02-13 RX ADMIN — AMLODIPINE BESYLATE 10 MILLIGRAM(S): 2.5 TABLET ORAL at 05:48

## 2017-02-13 RX ADMIN — HEPARIN SODIUM 5000 UNIT(S): 5000 INJECTION INTRAVENOUS; SUBCUTANEOUS at 17:42

## 2017-02-13 RX ADMIN — ATENOLOL 50 MILLIGRAM(S): 25 TABLET ORAL at 05:48

## 2017-02-13 RX ADMIN — Medication 650 MILLIGRAM(S): at 12:25

## 2017-02-13 RX ADMIN — Medication 650 MILLIGRAM(S): at 13:25

## 2017-02-13 RX ADMIN — CLOPIDOGREL BISULFATE 75 MILLIGRAM(S): 75 TABLET, FILM COATED ORAL at 11:58

## 2022-09-19 NOTE — H&P ADULT. - NS ABD PE RECTAL EXAM
Family Medicine Daily Progress Note     Date:  9/19/2022    Attending:  Fransisco Bejarano DO     S:  Werner Osuna is a 67 year old male who was admitted on 7/27/2022     Patient seen and examined, no new questions or concerns. Lying in bed comfortably at this time    REVIEW OF SYSTEMS:   Constitutional:  [x]?? no fever  [x]?? no chills  []?? no weakness  []?? no fatigue    Skin:  [x]?? no rash  []?? no bruising  []?? no wound  []?? no lesion  Head:  []?? no head injury   []?? no headache   []?? no dizziness  Eyes:  []?? no vision changes   []?? wears glasses or contacts   []?? no redness   []?? no drainage  Ears:  []?? no tinnitus   []?? no earache   []?? hearing aids   []?? no hearing changes  Nose:  []?? no stuffiness   []?? no nosebleeds   []?? no drainage  Throat:  []?? no soreness   []?? no dry mouth   []?? no hoarseness  Neck:  []?? no swollen glands   []?? no pain   []?? no stiffness  Respiratory:  []?? no cough  []?? no wheezing  [x]?? no dyspnea   []?? no hemoptysis   Cardiovascular:  [x]?? no chest pain  []?? no chest pressure  []?? no palpitations  []?? diaphoresis.   Gastrointestinal:  [x]?? no nausea []?? no vomiting  []?? no diarrhea [x]??  No abdominal pain   []?? no heartburn  Genitourinary:  []?? no urgency  []?? no frequency  []?? no hematuria  []??no flank pain  Extremities:  [x]?? no swelling  []?? no joint pain  Neurologic:  []?? no change in sensory  [x]??change in motor function  [x]?? no headache   [x]?? change in speech  Endocrine:  []?? no heat or cold intolerance  []?? no abnormal weight loss or gain   []?? no excessive sweating  Hematological:  []?? no bleeding  []?? no bruising  []?? no adenopathy  Psychiatric:  []?? no change in affect  []?? change in mentation  []?? no sleep disturbance       O:  Vital 24 Hour Range Most Recent Value   Temperature Temp  Min: 97.7 °F (36.5 °C)  Max: 99.2 °F (37.3 °C) 99.2 °F (37.3 °C)   Pulse Pulse  Min: 68  Max: 72 68   Respiratory Resp  Min: 18  Max:  26 (!) 26   Blood Pressure BP  Min: 120/67  Max: 143/76 (!) 143/76   Pulse Oximetry SpO2  Min: 96 %  Max: 99 %    O2 No data recorded      Vital Most Recent Value First Value   Weight 72.3 kg (159 lb 6.3 oz) Weight: 83.2 kg (183 lb 6.8 oz)   Height 6' 3\" (190.5 cm) Height: 6' 3\" (190.5 cm)     BP Readings from Last 3 Encounters:   09/19/22 (!) 143/76   01/10/22 138/88   12/13/21 138/78     Wt Readings from Last 3 Encounters:   09/19/22 72.3 kg (159 lb 6.3 oz)   01/10/22 80 kg (176 lb 5.9 oz)   12/13/21 80.7 kg (177 lb 14.6 oz)        I/O last 3 completed shifts:  In: 620 [P.O.:620]  Out: 1525 [Urine:1525]     Scheduled Medications   Current Facility-Administered Medications   Medication Dose Route Frequency Provider Last Rate Last Admin   • digoxin (LANOXIN) tablet 250 mcg  250 mcg Oral Daily Fransisco Player, DO   250 mcg at 09/18/22 0859   • sodium chloride (PF) 0.9 % injection 2 mL  2 mL Intracatheter 2 times per day Fam Carver, DO   2 mL at 09/18/22 0906   • tamsulosin (FLOMAX) capsule 0.4 mg  0.4 mg Oral Daily PC Fransisco Player, DO   0.4 mg at 09/18/22 0902   • polyethylene glycol (MIRALAX) packet 17 g  17 g Oral Daily Fam Carver, DO   17 g at 09/16/22 0927   • bacitracin ointment   Topical Daily Irene Birmingham NP       • amLODIPine (NORVASC) tablet 5 mg  5 mg Oral Daily Fransisco Player, DO   5 mg at 09/18/22 0900   • [Held by provider] aspirin chewable 81 mg  81 mg Oral Daily Fransisco Player, DO   81 mg at 08/25/22 0850   • atorvastatin (LIPITOR) tablet 40 mg  40 mg Oral Nightly Fransisco Player, DO   40 mg at 09/18/22 2154   • folic acid (FOLATE) tablet 1 mg  1 mg Oral Daily Fransisco Player, DO   1 mg at 09/18/22 0901   • lisinopril (ZESTRIL) tablet 40 mg  40 mg Oral Daily Fransisco Bejarano, DO   40 mg at 09/18/22 0903   • metoPROLOL tartrate (LOPRESSOR) tablet 100 mg  100 mg Oral 2 times per day Fransisco Bejarano, DO   100 mg at 09/18/22 2154   • thiamine (VITAMIN B1) tablet 100 mg  100 mg Oral Daily Fransisco Bejarano  DO   100 mg at 09/18/22 0902   • docusate sodium (COLACE) 50 MG/5ML liquid 200 mg  200 mg Oral Daily Fransisco Bejarano, DO   200 mg at 09/14/22 0903   • pantoprazole (PROTONIX INJECT) injection 40 mg  40 mg Intravenous Daily Nicole Vivas, DO   40 mg at 09/18/22 0905   • heparin (porcine) injection 5,000 Units  5,000 Units Subcutaneous 3 times per day Leah Stauffer MD   5,000 Units at 09/19/22 0506   • betamethasone dipropionate (DIPROSONE) 0.05 % cream   Topical BID Leah Stauffer MD   Given at 09/18/22 2155   • sodium chloride (PF) 0.9 % injection 10 mL  10 mL Injection 2 times per day Leah Stauffer MD   10 mL at 09/18/22 2154   • Eryn Ricks MD            PHYSICAL EXAM:   General: no distress  ENT:  Oral mucous membranes are moist  Cardiovascular: irregularly irregular +S1+S2.  Respiratory:  Normal respiratory effort.  Clear to auscultation anteriorly  Gastrointestinal:  Soft and non tender.  Normal bowel sounds.  No rebound, rigidity or guarding  Musculoskeletal:  No edema b/l LE.  Psychiatric: flattened affect    Most Recent Labs:  Recent Labs   Lab 09/19/22  0533 09/18/22  0726 09/17/22  1455 09/17/22  0533   SODIUM 139 142  --  141   POTASSIUM 4.1 3.9 4.1 3.5   CHLORIDE 107 108  --  108   CO2 28 30  --  27   BUN 6 7  --  10   CREATININE 0.63* 0.65*  --  0.70   GLUCOSE 95 99  --  84   ALBUMIN  --   --   --  1.9*   AST  --   --   --  13   BILIRUBIN  --   --   --  0.4     Recent Labs   Lab 09/19/22  0533 09/18/22  0726 09/17/22  0533   WBC 4.6 4.3 5.1   HGB 8.8* 8.2* 8.2*   HCT 27.4* 25.8* 25.4*    173 199   MCV 96.8 97.7 95.1       Imaging   7/27/2022 CT Head  IMPRESSION:     1.  No acute intracranial abnormality.   2.  Chronic left thalamostriate lacunar infarcts and patchy chronic small  vessel disease.    7/27/2022 CT angio head and neck  IMPRESSION:  CTA HEAD:    *  Occlusion of the basal artery and distal vertebral arteries with  nonopacified, likely occluded right PICA.  Extent of vertebral artery  thrombosis is not entirely certain.  Recommend angiographic assessment as  clinically warranted.  *  Preserved opacification of the basilar tip and bilateral PCAs likely  relate to collateral flow from the posterior communicating arteries.   CTA NECK:    *  No hemodynamically significant extracranial stenosis, occlusion or  dissection.  *  Multiple dental extractions with periapical lucencies involving multiple  right mandibular teeth as discussed. Suggest correlation with dental exam.  CT HEAD W CONTRAST:  *  Acute infarct of the right inferior cerebellum. Mass effect with partial  effacement of the fourth ventricle (posterior inferior cerebellar artery  territory).  Consider MRI for further assessment.  *  Chronic right maxillary sinusitis.    7/28/2022 MRI Brain wo contrast  IMPRESSION:    1.  Multifocal evolving infarcts in the bilateral cerebral hemispheres,  chaz and, to a lesser extent, the midbrain and bilateral occipital poles.  There are petechial blood product, predominantly in the inferior right  cerebellum, without overt hemorrhagic transformation. Continued follow-up  CT is recommended.  2.  Small evolving infarct in the left inferior frontal gyrus without mass  effect or hemorrhage.  3.  Partial effacement of fourth ventricle without evidence for herniation  or hydrocephalus.     7/29/2022 CT head wo contrast  IMPRESSION:  *  Evolving subacute infarcts present throughout the bilateral cerebellar  hemispheres, left occipital lobe, left frontal lobe, and left paramedian  chaz.  *  Mild posterior fossa mass effect with mild effacement of 4th ventricle  without evidence of  herniation, hemorrhage, or obstructive hydrocephalus.  *  Small foci of hypodensity in the left thalamus, suspect small evolving  thalamoperforator infarct.    7/30/2022 CT head wo contrast  IMPRESSION:  1.  Multifocal supratentorial and infratentorial evolving subacute  infarcts, worse within the vertebrobasilar territory and similar in extent  and configuration to prior.  2.  Similar mass effect contributing to effacement of the fourth ventricle  without evidence for supratentorial hydrocephalus.  3.  Unchanged petechial hemorrhage associated with infarcts in the  cerebellar hemispheres and left occipital lobe.    7/30/2022 CXR  IMPRESSION:  1.  No acute cardiopulmonary findings.   2.  Mild cardiac enlargement.  3.  Gaseous colonic distention, incompletely visualized and evaluated    7/31/2022 CT Head wo contrast  IMPRESSION:  1.  Evolving infarcts within the bilateral cerebellar hemispheres and chaz  with similar degree of posterior fossa mass effect including mild right  cerebellar tonsillar ectopia and fourth ventricular effacement. No evidence  for interval supratentorial hydrocephalus.  2.  Multifocal small supratentorial evolving infarcts without significant  mass effect or midline shift.  3.  Unchanged mild petechial hemorrhage associated with infarcts in the  cerebellum and left occipital lobe. No new or enlarging hemorrhage.    8/4/2022 CT Head   IMPRESSION:    1.  Evolving infarcts in the bilateral cerebral hemispheres and chaz with a  similar degree of cerebellar tonsillar ectopia and effacement of the fourth  ventricle. There is mildly increased prominence of the supratentorial  ventricular system raising concern for early hydrocephalus.   2.  Multifocal smaller supratentorial evolving infarcts as described above,  similar to prior.  3.  Unchanged mild petechial hemorrhage in the left occipital lobe and  cerebellum. No new or enlarging hemorrhage.    8/4/2022 CT Angio head and neck  IMPRESSION:  CTA  neck:    1.  Patent extracranial vasculature including patent recanalized distal  right vertebral artery. No evidence of acute extracranial vascular  occlusion or near-occlusion.  2.  Nonflow limiting atherosclerosis of the aortic arch, great vessel  origins and carotid bifurcations.  3.  Nasogastric tube curled within the pharynx.  CTA head:    1.  Patent intracranial vasculature including recanalization of the right  vertebral artery and basilar artery.  2.  Residual severe stenosis at the right PICA artery origin and mild  diffuse caliber attenuation of the basilar artery and bilateral superior  cerebellar arteries.   3.  No evidence of new intracranial large vessel occlusion.      8/5/2022 CT head wo contrast  IMPRESSION:  1.  Overall similar appearance of evolving infarcts within the posterior  fossa with associated fourth ventricular effacement, cerebellar tonsillar  herniation and leftward cerebellar vermis midline shift. Stable  supratentorial ventricular caliber.  2.  Multifocal small supratentorial evolving infarct without significant  mass effect or midline shift.  3.  Unchanged mild petechial hemorrhage associated with infarcts in the  cerebellum and left occipital lobe. No new or enlarging hemorrhage.    8/6/2022 CT Head wo contrast  IMPRESSION:  1.  Redemonstrated extensive cerebellar infarction and edema with tonsillar  ectopia and effacement of the fourth ventricle. The lateral and third  ventricles remain stable in size.  2.  Evolving infarcts also noted in the left occipital lobe as well as  possibly the right occipital lobe, chaz and thalami.    8/12/2022 CT head wo contrast  IMPRESSION:  1.  Continued evolution of infarcts involving the cerebellum and left  occipital lobe with development of petechial hemorrhage. There remains mild  mass effect with improved effacement of the fourth ventricle. No  herniation.  2.  Additional possible evolving infarcts involving the thalami are again  seen.      8/13/2022 CT Head wo contrast  IMPRESSION:    1.  Evolving infarcts in the cerebellum and left occipital lobe with  slightly increased internal blood products. Mild local mass effect is  stable.  2.  Punctate evolving infarcts in the bilateral thalami, slightly more  conspicuous on the right.    8/15/2022 CT Head wo contrast  IMPRESSION:  Stable evolving infarcts in the cerebellum, chaz, thalami and left  occipital lobe, with associated hemorrhage in the cerebellum and left  occipital lobe.    8/25/2022 CT Head wo contrast  IMPRESSION:    1.  Evolving infarcts in the cerebellum and left occipital lobe with  slightly decreased edema and decreased internal blood products. No  significant mass effect.  2.  Slightly progressive hypodensity in the left frontal white matter,  likely also representing a small evolving infarct. No mass effect or  hemorrhage.  3.  Punctate infarcts in the bilateral thalami are unchanged.    8/25/2022 XR Teeth  FINDINGS / IMPRESSION:  1.  Multiple mandibular dental extractions, crown erosions and retained  root tip fragments.  Diffuse periodontal disease.  2.  Decoronation and periapical abscess involving #24.   3.  Maxillary edentulous.    8/31/2022 CT chest abdomen/pelvis w contrast  IMPRESSION:  1.  Polypoid mass arising from the anterior aspect of the rectum  corresponding to the abnormality noted on recent colonoscopy. Please  correlate with pending pathology results.  2.  An enlarged 0.8 cm perirectal lymph node is indeterminate but could  represent normal metastasis.  3.  Mildly enlarged indeterminate left supraclavicular lymph nodes  measuring up to 1.2 cm do not appear significantly changed compared to  6/18/2019.  4.  Small bilateral pulmonary micronodules are likely benign. Continued  attention on follow-up.  5.  Dilatation of the main pulmonary artery which may be seen in the  setting of pulmonary arterial hypertension.  6.  Patchy groundglass opacities within the left lower  lobe suspicious for  mild infectious/inflammatory process.  7.  Patchy area of hypoenhancement within the anterior left kidney could  represent renal infarct versus area of pyelonephritis. Correlation with  urinalysis is recommended.    8/31/2022 CT Head wo contrast  IMPRESSION:  *  Continued evolution of hemorrhagic infarcts within the bilateral  cerebellar hemispheres, and nonhemorrhagic infarcts in the left occipital  lobe, chaz, left frontal lobe.   *  No enhancing new infarct, hemorrhage, or mass effect.  *  Chronic-appearing right maxillary sinus disease.    9/9/2022 MRI Rectum 3T  Impression:  Prematurely terminated scan due to patient inability to tolerate full scan.  Repeat MRI may be performed under anesthesia as clinically able.  1.  Anterior mid to high rectal mass measuring 4.5 cm in length. Excessive  patient motion precludes assessment for invasion of tumor into the  perirectal fat.  2.  Suspected mesorectal and JESSI chain lymphadenopathy, also incompletely  Assessed.    9/15/2022 MRI Rectum  Impression:  1.  Category: T3c N1 mid to high rectal tumor semicircumferentially  involving the anterior rectum, with cicatrization suggesting shallow  extramural invasion. Suspicious JESSI chain lymph nodes measuring up to 8 mm.  A 7 mm right mesorectal lymph node is present with no additional suspicious  features.  2.  CRM: clear  3.  Sphincter involvement: absent  4.  No peritoneal reflection involvement.    9/17/2022 CT Head wo contrast  IMPRESSION:  Continued evolution of infarcts in the cerebellar hemispheres, right  greater than left. Infarcts in the left occipital lobe, left frontoparietal  region and brainstem demonstrate interval evolution as well. No definite  new infarct is identified. The hemorrhagic components involving the  cerebellar infarcts appear decreased.  If there is continued clinical concern for new area of ischemia, consider  MRI.    ASSESSMENT AND PLAN:   Werner Thao is a 67 year old  male who was admitted on 7/27/2022    Decisional Capacity - Non-decisional   - Psych evaluation, Dr. Jarrett, identified as lacking the capacity for medical decision making  - activating the surrogate decision maker, Spouse (although ) Erin Osuna, 9/9/2022  - DNR/DNI activated 9/8/2022, state bracelet placed 9/9/2022    Rectal mass   Rectal mass, biopsies:   -Tubulovillous adenoma with high-grade dysplasia.  Please see microscopic description  =================================================================================  - GI on consult, colonoscopy 9/16/2022:  Postoperative Diagnosis:    1. Large irregular appearing rectal mass identified, as previously seen that was occupying over 50 % of luminal circumference. Numerous biopsies obtained.  2. Subcentimeter polyps noted in the remainder of the colon, however these were left in place.  - surgical pathology from colonoscopy 9/16/2022 is pending  - colorectal surgery on consult  - oncology on consult, will discuss with colorectal surgery now that MRI rectum has been completed.  - holding asa currently     Poor dentition s/p Full Edentulation with Alveoloplasty and Mandibular Angelique Reduction 9/8/2022, Dr. 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 Bird, no specific surgical intervention at this time, signed off 8/7/2022  - basa daily - held 8/26/2022  - PTA  Eliquis has been held, with new rectal mass, need further work up before starting full Anticoagulation  - STAT CT Head 9/17/2022 negative for acute changes, consider heparin ggt, but concern about bleeding again from the rectal mass --> discussed with SDM, Erin at 200pm 9/17/2022    Dysphagia  - GI on consult, improved swallowing and ability to eat, no need for PEG at this time, allow previous PEG site that patient pulled out to heal  - Nutritional services on consult  - speech therapy on consult    Diabetic foot ulcer  Shearing of the buttock  - wound care following, recommends: 8/18/2022  Iodoflex and wound gel to the left plantar foot wound, cover with gauze and kerlix wrap, change 3x weekly and PRN  Nutrashield to dorsal toes   Nutrashield to buttocks TID and PRN    NICM (nonischemic cardiomyopathy) (CMS/HCC)  7/28/2022 ECHO:  Normal left ventricular systolic function. LVEF 54%.  Hyperdynamic LV apical walls with small apical pouch visualized with Definity contrast.  Mildly increased RV size with mildly reduced systolic function.  Right ventricular systolic pressure; 43 mmHg (estimated RAP 10).  Moderate tricuspid valve regurgitation.  Dilated sinuses of Valsalva (42 mm) and ascending aorta (40 mm).  No pericardial effusion.  No recent echocardiogram available for comparison. LVEF has improved compared to study from 2019 (30% to 54%).  =================================================================================  - follows with EP as an outpatient  - continues the metopolol 100mg BID  - Lisinopril 40mg daily  - amlodipine 5mg daily   - prn hydralazine and labetalol IV are accessible     Rheumatoid arthritis involving multiple sites, unspecified whether rheumatoid factor present (CMS/[facility])  - does not use medication to control  - previously followed by Rheumatology but no longer     Chronic systolic (congestive) heart failure (CMS/[facility])  7/28/2022 ECHO:  Normal left ventricular systolic function. LVEF  54%.  Hyperdynamic LV apical walls with small apical pouch visualized with Definity contrast.  Mildly increased RV size with mildly reduced systolic function.  Right ventricular systolic pressure; 43 mmHg (estimated RAP 10).  Moderate tricuspid valve regurgitation.  Dilated sinuses of Valsalva (42 mm) and ascending aorta (40 mm).  No pericardial effusion.  No recent echocardiogram available for comparison. LVEF has improved compared to study from 2019 (30% to 54%).  =================================================================================  - continues the metopolol 100mg BID  - continues the atorvastatin 40mg daily  - continues the lisinopril 40mg daily     Alcohol dependence with unspecified alcohol-induced disorder (CMS/HCC)  States no longer drinks since the major trauma in December 2021      Persistent atrial fibrillation (CMS/HCC)  - follows with EP as an outpatient  - continues the metopolol 100mg BID  - cont digoxin 250mcg daily  - PTA Eliquis has been held with new rectal mass, need further work up before starting full Anticoagulation     Primary hypertension  Continues the lisinopril 40mg daily  - continues the metopolol 100mg BID  - Holding the lasix 20mg daily  - amlodipine 5mg daily 8/13  - hydralazine and labetalol IV prn as well     Patient Care Team:  Fransisco Bejarano DO as PCP - General (Family Practice)  Aliza Carlton RN as Care Transitions Nurse (Registered Nurse)    Dispo: pending work up    Fransisco Bejarano DO    9/19/2022                     not examined

## 2025-05-09 NOTE — H&P ADULT. - PROBLEM SELECTOR PROBLEM 5
..  [facility]  Heart Failure Progress Note         Regina Phillips, female  : 1948  PCP: Anyi Chapin MD  Attending/Consulting Provider: Scarlet Echevarria Noé Vazquez MD   9 mg at 05/06/25 9556       [id number]          Race                Other   #     Account #       [id number]       Room #              723     Accession #     [id number]        Referring Physician Hermelindo Joyce     Date of Birth   1948       Sonographer         Daniel Lemus     Age             76 year(s)       Interpreting        [facility]                                    Physician           Vascular                                                        Triston Bolanos  Triston Bolanos MD(Interpreting physician) on   04/26/[id number]          Race                Other   #     Account #       [id number]       Room #              723     Accession #     [id number]        Referring Physician Hermelindo Joyce     Date of Birth   1948       Sonographer         Daniel Lemus     Age             76 year(s)       Interpreting        [facility]                                    Physician           Vascular                                                        Triston Bolanos Dementia